# Patient Record
(demographics unavailable — no encounter records)

---

## 2024-12-10 NOTE — HISTORY OF PRESENT ILLNESS
[de-identified] : Patient last seen about 2 1/2 years ago for JEANE/SAC - she presents now coughing - she has been treated with Singulair - she has albuterol for prn use - she has used sporadically with some relief of her symptoms.    Her eyes will intermittently flare up and she usually will need a course of prednisone.

## 2024-12-10 NOTE — ASSESSMENT
[FreeTextEntry1] : Mild persistent asthma:  Singulair 10 mg QD Albuterol 2 puffs QID prn   Contact Dermatitis - patient will need eye drops compounded if she needs eye drops in the future.

## 2024-12-10 NOTE — PHYSICAL EXAM
[Alert] : alert [Well Nourished] : well nourished [No Acute Distress] : no acute distress [Well Developed] : well developed [No Neck Mass] : no neck mass was observed [No LAD] : no lymphadenopathy [No Thyroid Mass] : no thyroid mass [Normal Rate and Effort] : normal respiratory rhythm and effort [No Crackles] : no crackles [No Retractions] : no retractions [Wheezing] : no wheezing was heard [Normal Rate] : heart rate was normal  [Normal S1, S2] : normal S1 and S2 [Regular Rhythm] : with a regular rhythm [Normal Cervical Lymph Nodes] : cervical [Patches] : ~M patches present [Normal Mood] : mood was normal [Judgment and Insight Age Appropriate] : judgement and insight is age appropriate [de-identified] : erythematous patches on cheeks